# Patient Record
Sex: FEMALE | Race: WHITE | NOT HISPANIC OR LATINO | URBAN - NONMETROPOLITAN AREA
[De-identification: names, ages, dates, MRNs, and addresses within clinical notes are randomized per-mention and may not be internally consistent; named-entity substitution may affect disease eponyms.]

---

## 2021-06-25 ENCOUNTER — HOSPITAL ENCOUNTER (OUTPATIENT)
Facility: HOSPITAL | Age: 51
Discharge: ANOTHER HOSPITAL | End: 2021-06-25
Payer: COMMERCIAL

## 2021-06-25 PROCEDURE — A0425 GROUND MILEAGE: HCPCS | Mod: SH,QN

## 2021-06-25 PROCEDURE — A0427 ALS1-EMERGENCY: HCPCS | Mod: SH,QN

## 2021-06-26 ENCOUNTER — HOSPITAL ENCOUNTER (EMERGENCY)
Facility: HOSPITAL | Age: 51
Discharge: HOME | End: 2021-06-26
Attending: PHYSICIAN ASSISTANT
Payer: COMMERCIAL

## 2021-06-26 ENCOUNTER — APPOINTMENT (OUTPATIENT)
Dept: CT IMAGING | Facility: HOSPITAL | Age: 51
End: 2021-06-26
Payer: COMMERCIAL

## 2021-06-26 ENCOUNTER — APPOINTMENT (OUTPATIENT)
Dept: RADIOLOGY | Facility: HOSPITAL | Age: 51
End: 2021-06-26
Payer: COMMERCIAL

## 2021-06-26 VITALS
SYSTOLIC BLOOD PRESSURE: 129 MMHG | WEIGHT: 293 LBS | OXYGEN SATURATION: 94 % | HEART RATE: 77 BPM | HEIGHT: 71 IN | RESPIRATION RATE: 18 BRPM | BODY MASS INDEX: 41.02 KG/M2 | TEMPERATURE: 98 F | DIASTOLIC BLOOD PRESSURE: 76 MMHG

## 2021-06-26 DIAGNOSIS — M25.552 ACUTE HIP PAIN, LEFT: Primary | ICD-10-CM

## 2021-06-26 PROCEDURE — 6360000200 HC RX 636 W HCPCS (ALT 250 FOR IP): Performed by: PHYSICIAN ASSISTANT

## 2021-06-26 PROCEDURE — 96375 TX/PRO/DX INJ NEW DRUG ADDON: CPT

## 2021-06-26 PROCEDURE — 99284 EMERGENCY DEPT VISIT MOD MDM: CPT | Mod: GF | Performed by: PHYSICIAN ASSISTANT

## 2021-06-26 PROCEDURE — 99284 EMERGENCY DEPT VISIT MOD MDM: CPT | Performed by: PHYSICIAN ASSISTANT

## 2021-06-26 PROCEDURE — 73502 X-RAY EXAM HIP UNI 2-3 VIEWS: CPT | Mod: LT

## 2021-06-26 PROCEDURE — 96376 TX/PRO/DX INJ SAME DRUG ADON: CPT

## 2021-06-26 PROCEDURE — 72192 CT PELVIS W/O DYE: CPT

## 2021-06-26 PROCEDURE — 96374 THER/PROPH/DIAG INJ IV PUSH: CPT

## 2021-06-26 RX ORDER — MORPHINE SULFATE 2 MG/ML
2 INJECTION, SOLUTION INTRAMUSCULAR; INTRAVENOUS ONCE
Status: COMPLETED | OUTPATIENT
Start: 2021-06-26 | End: 2021-06-26

## 2021-06-26 RX ORDER — FENTANYL CITRATE/PF 50 MCG/ML
25 PLASTIC BAG, INJECTION (ML) INTRAVENOUS AS NEEDED
Status: DISCONTINUED | OUTPATIENT
Start: 2021-06-26 | End: 2021-06-26 | Stop reason: HOSPADM

## 2021-06-26 RX ORDER — MULTIVITAMIN
1 TABLET ORAL DAILY
COMMUNITY

## 2021-06-26 RX ORDER — HYDROCODONE BITARTRATE AND ACETAMINOPHEN 5; 325 MG/1; MG/1
1 TABLET ORAL ONCE
Status: COMPLETED | OUTPATIENT
Start: 2021-06-26 | End: 2021-06-26

## 2021-06-26 RX ORDER — PAROXETINE HYDROCHLORIDE 20 MG/1
20 TABLET, FILM COATED ORAL EVERY MORNING
COMMUNITY

## 2021-06-26 RX ORDER — LOSARTAN POTASSIUM 100 MG/1
100 TABLET ORAL DAILY
COMMUNITY

## 2021-06-26 RX ORDER — HYDROCHLOROTHIAZIDE 50 MG/1
50 TABLET ORAL DAILY
COMMUNITY

## 2021-06-26 RX ORDER — CYCLOBENZAPRINE HCL 10 MG
1 TABLET ORAL ONCE
Status: COMPLETED | OUTPATIENT
Start: 2021-06-26 | End: 2021-06-26

## 2021-06-26 RX ORDER — FENTANYL CITRATE/PF 50 MCG/ML
25 PLASTIC BAG, INJECTION (ML) INTRAVENOUS ONCE
Status: DISCONTINUED | OUTPATIENT
Start: 2021-06-26 | End: 2021-06-26 | Stop reason: HOSPADM

## 2021-06-26 RX ORDER — ATORVASTATIN CALCIUM 40 MG/1
40 TABLET, FILM COATED ORAL DAILY
COMMUNITY

## 2021-06-26 RX ADMIN — FENTANYL CITRATE 25 MCG: 50 INJECTION, SOLUTION INTRAMUSCULAR; INTRAVENOUS at 01:42

## 2021-06-26 RX ADMIN — MORPHINE SULFATE 2 MG: 2 INJECTION, SOLUTION INTRAMUSCULAR; INTRAVENOUS at 02:06

## 2021-06-26 RX ADMIN — HYDROCODONE BITARTRATE AND ACETAMINOPHEN 1 PACKAGE: 5; 325 TABLET ORAL at 02:28

## 2021-06-26 RX ADMIN — Medication 1 PACKAGE: at 02:28

## 2021-06-26 RX ADMIN — FENTANYL CITRATE 25 MCG: 50 INJECTION, SOLUTION INTRAMUSCULAR; INTRAVENOUS at 00:53

## 2021-06-26 NOTE — ED PROVIDER NOTES
HPI:  Chief Complaint   Patient presents with   • Hip Pain     Kellie Hernandez is a 50 y.o. brought by ambulance for concern over left hip pain.  Patient states chronic history of left hip pain. No specific reasoning noted. No direct injury to area. Patient states riding motorcycle all day, having pain since the morning. Has taken ibuprofen 800 mg, tylenol 1000 mg and celebrex throughout the day without much improvement. Tried ice and heat without much improvement. Does have limited range of motion due to pain on a good day. Pain does radiate down left buttock into leg. No back pain. No numbness or tingling or weakness.       HISTORY:  Past Medical History:   Diagnosis Date   • Hypercholesteremia    • Hypertension      Past Surgical History:   Procedure Laterality Date   • APPENDECTOMY     • CHOLECYSTECTOMY     • HYSTERECTOMY     • KNEE SURGERY Bilateral      History reviewed. No pertinent family history.    Social History     Tobacco Use   • Smoking status: Never Smoker   • Smokeless tobacco: Never Used   Substance Use Topics   • Alcohol use: Yes     Comment: occasional   • Drug use: Not Currently     ROS:  Review of Systems   All other systems reviewed and are negative.    PE:  ED Triage Vitals [06/26/21 0023]   Temp Heart Rate Resp BP SpO2   36.7 °C (98 °F) 80 18 115/98 93 %      Temp Source Heart Rate Source Patient Position BP Location FiO2 (%)   Oral -- -- -- --       Physical Exam  Vitals and nursing note reviewed.   Constitutional:       General: She is in acute distress.      Appearance: She is well-developed.   HENT:      Head: Normocephalic and atraumatic.   Eyes:      Conjunctiva/sclera: Conjunctivae normal.   Cardiovascular:      Rate and Rhythm: Normal rate and regular rhythm.      Heart sounds: No murmur heard.     Pulmonary:      Effort: Pulmonary effort is normal. No respiratory distress.      Breath sounds: Normal breath sounds.   Abdominal:      Palpations: Abdomen is soft.      Tenderness: There is  no abdominal tenderness.   Musculoskeletal:      Cervical back: Neck supple.      Right hip: Normal.      Left hip: No deformity, lacerations, tenderness, bony tenderness or crepitus. Decreased range of motion. Decreased strength.      Right upper leg: Normal.      Left upper leg: Normal.   Skin:     General: Skin is warm and dry.   Neurological:      Mental Status: She is alert.       ED LABS:  Labs Reviewed - No data to display      ED IMAGES:  CT PELVIS WO IV CONTRAST   Preliminary Result   IMPRESSION:      1.  No acute disease.      X-ray hip 2 or 3 views left    (Results Pending)     ED PROCEDURES:  Procedures    ED COURSE:  ED Course as of Jun 26 0311   Sat Jun 26, 2021   0034 Patient upon arrival has approximately  100 fentanyl given via ambulance. Patient asked to sit up had pain, but then the pain was relieved and is now feeling much better with decreased pain.     [GW]   0201 Ct scan showing no acute disease. Patient states improvement with pain. Will prescribe hydrocodone for pain and flexeril, discussed side effects. Patient able to ambulate with discomfort. Patient to follow up with primary care provider. Patient was evaluated in the emergency room. Patient resting comfortably on gurney in no acute distress. History was reviewed with the patient. Kellie Hernandez participated in the decision making process. All questions where sought and answered.  The diagnosis and differential was discussed with the patient in detail. Additional verbal and written instructions were given. Common language was used. The patient verbalizes understanding and is comfortable with the plan and agrees to close follow up.       [GW]      ED Course User Index  [GW] Edgar Byrd PA-C          Sepsis Quality Bundle     MDM:  MDM    Final diagnoses:   [M25.552] Acute hip pain, left        Edgar Byrd PA-C  06/26/21 0311

## 2021-06-26 NOTE — DISCHARGE INSTRUCTIONS
Apply ice or heat to area, which ever helps with pain.  Use ibuprofen scheduled 3-4 tablets.   Use hydrocodone for extra pain relief.  Use Flexeril 1/2 tablet to 1 tablet for muscle spasms.   If any new symptoms occur or symptoms do not improve as anticipated please return to clinic or go to the emergency room.

## 2021-06-26 NOTE — ED TRIAGE NOTES
"Patient presents to the ED via ambulance. Patient complains of left hip pain that was ongoing throughout the day. She was riding motorcycle today and states that getting on and off was difficult and uncomfortable. She has a history of hip dislocations. Upon arrival she requests to sit up. I raised the head of her bed to 45 degrees. The patient stated she \"felt something move\", and after a sharp pain, her overall pain was greatly improved.   "

## 2023-05-09 ENCOUNTER — APPOINTMENT (RX ONLY)
Dept: URBAN - METROPOLITAN AREA CLINIC 312 | Facility: CLINIC | Age: 53
Setting detail: DERMATOLOGY
End: 2023-05-09

## 2023-05-09 ENCOUNTER — APPOINTMENT (RX ONLY)
Dept: URBAN - METROPOLITAN AREA CLINIC 316 | Facility: CLINIC | Age: 53
Setting detail: DERMATOLOGY
End: 2023-05-09

## 2023-05-09 PROBLEM — C44.1122 BASAL CELL CARCINOMA OF SKIN OF RIGHT LOWER EYELID, INCLUDING CANTHUS: Status: ACTIVE | Noted: 2023-05-09

## 2023-05-09 PROBLEM — C44.319 BASAL CELL CARCINOMA OF SKIN OF OTHER PARTS OF FACE: Status: ACTIVE | Noted: 2023-05-09

## 2023-05-09 PROCEDURE — ? REVIEW OF OUTSIDE PATHOLOGY REPORTS

## 2023-05-09 PROCEDURE — ? REFERRAL CORRESPONDENCE

## 2023-05-09 PROCEDURE — ? MOHS SURGERY

## 2023-05-09 PROCEDURE — ? REPAIR NOTE

## 2023-05-09 PROCEDURE — 17312 MOHS ADDL STAGE: CPT

## 2023-05-09 PROCEDURE — 14040 TIS TRNFR F/C/C/M/N/A/G/H/F: CPT

## 2023-05-09 PROCEDURE — 17311 MOHS 1 STAGE H/N/HF/G: CPT

## 2023-05-09 NOTE — PROCEDURE: REPAIR NOTE
Rotation Flap Text: Given the location of the defect, shape of the defect and the proximity to free margins a rotation flap was deemed most appropriate. Using a sterile surgical marker an appropriate rotation flap was drawn incorporating the defect and designing a rotation flap based laterally on the right, with advancement of the cheek medially to close the secondary defect with the Burow's triangle excised at the lateral ala.  The area thus outlined was incised through dermis with a #15 scalpel blade.  The skin margins were undermined in the subcutaneous plane. The dissection proceeded until the flap could be rotated and advanced into the defect without undue tension.  A deep suture was placed to secure the base of the flap to the periosteum of the nasal sidewall superiorly to recreate the normal concavity in this area (note:  the defect extended to periosteum in the superior half of the defect, with intact orbicularis muscle inferiorly so the inferior canaliculus should still be intact.

## 2023-05-09 NOTE — HPI: PROCEDURE (SKIN SURGERY)
Has The Growth Been Previously Biopsied?: has been previously biopsied
Additional History: Dr. Ko sent pt to us for Mohs Closure

## 2023-12-07 NOTE — PROCEDURE: REPAIR NOTE
Double O-Z Plasty Text: The defect edges were debeveled with a #15 scalpel blade.  Given the location of the defect, shape of the defect and the proximity to free margins a Double O-Z plasty (double transposition flap) was deemed most appropriate.  Using a sterile surgical marker, the appropriate transposition flaps were drawn incorporating the defect and placing the expected incisions within the relaxed skin tension lines where possible. The area thus outlined was incised deep to adipose tissue with a #15 scalpel blade.  The skin margins were undermined to an appropriate distance in all directions utilizing iris scissors.  Hemostasis was achieved with electrocautery.  The flaps were then transposed into place, one clockwise and the other counterclockwise, and anchored with interrupted buried subcutaneous sutures. none